# Patient Record
Sex: FEMALE | Race: ASIAN | NOT HISPANIC OR LATINO | URBAN - METROPOLITAN AREA
[De-identification: names, ages, dates, MRNs, and addresses within clinical notes are randomized per-mention and may not be internally consistent; named-entity substitution may affect disease eponyms.]

---

## 2017-03-16 ENCOUNTER — EMERGENCY (EMERGENCY)
Facility: HOSPITAL | Age: 22
LOS: 1 days | Discharge: HOME | End: 2017-03-16
Attending: EMERGENCY MEDICINE | Admitting: EMERGENCY MEDICINE
Payer: COMMERCIAL

## 2017-03-16 VITALS
SYSTOLIC BLOOD PRESSURE: 133 MMHG | OXYGEN SATURATION: 98 % | TEMPERATURE: 98 F | DIASTOLIC BLOOD PRESSURE: 90 MMHG | RESPIRATION RATE: 16 BRPM | HEART RATE: 72 BPM

## 2017-03-16 DIAGNOSIS — S01.111A LACERATION WITHOUT FOREIGN BODY OF RIGHT EYELID AND PERIOCULAR AREA, INITIAL ENCOUNTER: ICD-10-CM

## 2017-03-16 DIAGNOSIS — S05.01XA INJURY OF CONJUNCTIVA AND CORNEAL ABRASION WITHOUT FOREIGN BODY, RIGHT EYE, INITIAL ENCOUNTER: ICD-10-CM

## 2017-03-16 DIAGNOSIS — Z88.0 ALLERGY STATUS TO PENICILLIN: ICD-10-CM

## 2017-03-16 PROCEDURE — 99283 EMERGENCY DEPT VISIT LOW MDM: CPT | Mod: 25

## 2017-03-16 PROCEDURE — 12011 RPR F/E/E/N/L/M 2.5 CM/<: CPT

## 2017-03-16 RX ORDER — CIPROFLOXACIN HCL 0.3 %
2 DROPS OPHTHALMIC (EYE)
Qty: 1 | Refills: 0 | OUTPATIENT
Start: 2017-03-16 | End: 2017-03-21

## 2017-03-16 RX ORDER — FLUORESCEIN SODIUM 9 MG
1 STRIP OPHTHALMIC (EYE) ONCE
Qty: 0 | Refills: 0 | Status: COMPLETED | OUTPATIENT
Start: 2017-03-16 | End: 2017-03-16

## 2017-03-16 RX ADMIN — Medication 1 APPLICATION(S): at 15:36

## 2017-03-16 RX ADMIN — Medication 1 DROP(S): at 15:36

## 2017-03-16 NOTE — ED PROCEDURE NOTE - CPROC ED POST PROC CARE GUIDE1
Instructed patient/caregiver to follow-up with primary care physician./Verbal/written post procedure instructions were given to patient/caregiver./Instructed patient/caregiver regarding signs and symptoms of infection./Keep the cast/splint/dressing clean and dry.

## 2017-03-16 NOTE — ED PROVIDER NOTE - MEDICAL DECISION MAKING DETAILS
Small superficial laceration due to falling ice. No ocular trauma. No indication for imaging at this time. Offered plastic surgeon to repair injury, but pt prefers me to repair laceration in the ED instead. Tetanus UTD.

## 2017-03-16 NOTE — ED PROVIDER NOTE - EYES, MLM
Clear bilaterally, pupils equal, round and reactive to light. EOMI, normal conjunctiva. (see skin exam) pupils equal, round and reactive to light. EOMI, mild R conjunctival injection, 20/20 OS 20/30 OD (not corrected) fluorescein with small abrasion R outer quadrant of cornea, no FB

## 2017-03-16 NOTE — ED PROVIDER NOTE - CHPI ED SYMPTOMS NEG
no fever/no vomiting/no LOC, no change in vision, no HA, no dizziness, no N/V, no foreign body sensation

## 2017-03-16 NOTE — ED PROVIDER NOTE - NS ED MD SCRIBE ATTENDING SCRIBE SECTIONS
REVIEW OF SYSTEMS/HIV/HISTORY OF PRESENT ILLNESS/DISPOSITION/PHYSICAL EXAM/VITAL SIGNS( Pullset)/PAST MEDICAL/SURGICAL/SOCIAL HISTORY

## 2017-03-16 NOTE — ED ADULT NURSE NOTE - OBJECTIVE STATEMENT
Laceration to outside of right eye lid in crease. Pt states vision not changed but eye keeps tearing.

## 2017-03-16 NOTE — ED PROVIDER NOTE - OBJECTIVE STATEMENT
22 yo F with no PMHx, Tetanus UTD, no daily medication use, NKDA presents with right outer eye laceration due to falling ice today. Pt states that she saw bleeding and decided to come to the ED. Denies LOC, change in vision, foreign body sensation, HA, dizziness and N/V. Non-smoker. Socially drinks. 22 yo F with no PMHx, Tetanus UTD, no daily medication use, NKALANA presents with right outer eye laceration due to falling ice today. Pt states that she saw bleeding and decided to come to the ED. Denies LOC, change in vision, foreign body sensation in eye or eye pain, HA, dizziness and N/V. Non-smoker. Socially drinks.

## 2017-03-16 NOTE — ED PROVIDER NOTE - PROGRESS NOTE DETAILS
laceration repaired per procedure note, small corneal abrasion, will rx cipro gtt to R eye, give strict return precautions, suture removal 5-7 days, scar counseling, follow up with plastics and optho

## 2017-03-23 ENCOUNTER — EMERGENCY (EMERGENCY)
Facility: HOSPITAL | Age: 22
LOS: 1 days | Discharge: HOME | End: 2017-03-23
Attending: EMERGENCY MEDICINE | Admitting: EMERGENCY MEDICINE

## 2017-03-23 VITALS
DIASTOLIC BLOOD PRESSURE: 80 MMHG | TEMPERATURE: 98 F | HEIGHT: 65 IN | RESPIRATION RATE: 18 BRPM | OXYGEN SATURATION: 99 % | SYSTOLIC BLOOD PRESSURE: 130 MMHG | WEIGHT: 139.99 LBS | HEART RATE: 74 BPM

## 2017-03-23 DIAGNOSIS — S01.111D LACERATION WITHOUT FOREIGN BODY OF RIGHT EYELID AND PERIOCULAR AREA, SUBSEQUENT ENCOUNTER: ICD-10-CM

## 2017-03-23 NOTE — ED PROVIDER NOTE - OBJECTIVE STATEMENT
20 y/o F presents to ED for suture removal.  Pt had sutures placed 7 days ago.  She denies any complaints.
